# Patient Record
Sex: MALE | Race: WHITE | URBAN - METROPOLITAN AREA
[De-identification: names, ages, dates, MRNs, and addresses within clinical notes are randomized per-mention and may not be internally consistent; named-entity substitution may affect disease eponyms.]

---

## 2024-10-30 ENCOUNTER — TELEPHONE (OUTPATIENT)
Dept: TRANSPLANT | Facility: CLINIC | Age: 39
End: 2024-10-30

## 2024-11-11 ENCOUNTER — TELEPHONE (OUTPATIENT)
Dept: TRANSPLANT | Facility: CLINIC | Age: 39
End: 2024-11-11

## 2024-11-18 ENCOUNTER — TELEPHONE (OUTPATIENT)
Dept: TRANSPLANT | Facility: CLINIC | Age: 39
End: 2024-11-18
Payer: COMMERCIAL

## 2024-11-18 ENCOUNTER — EPISODE CHANGES (OUTPATIENT)
Dept: TRANSPLANT | Facility: CLINIC | Age: 39
End: 2024-11-18

## 2024-11-18 DIAGNOSIS — N18.6 END STAGE RENAL DISEASE: Primary | ICD-10-CM

## 2024-11-18 DIAGNOSIS — Z76.82 ORGAN TRANSPLANT CANDIDATE: ICD-10-CM

## 2024-11-27 ENCOUNTER — TELEPHONE (OUTPATIENT)
Dept: TRANSPLANT | Facility: CLINIC | Age: 39
End: 2024-11-27
Payer: COMMERCIAL

## 2024-12-02 ENCOUNTER — TELEPHONE (OUTPATIENT)
Dept: TRANSPLANT | Facility: CLINIC | Age: 39
End: 2024-12-02
Payer: COMMERCIAL

## 2024-12-02 NOTE — TELEPHONE ENCOUNTER
MA notes per Adherence form     PD PT    FOR THE PAST THREE MONTHS:    0-AMA's  0-No-shows    No concerns with care giving, transportation, or mental health    Pt is now with   [facility] ([facility])  [address] [place] [id number]    [phone] fax [phone]  Admit date 8/19/24  PD PT    Scanned in pt's media    Taina Bonilla  Abdominal Transplant MA

## 2024-12-02 NOTE — TELEPHONE ENCOUNTER
Compliance form sent to Dialysis Unit    [facility]   [address] [place] [address]    [phone]  Admit date 8/19/24  PD PT    Hafsa Villalobos MA

## 2024-12-03 ENCOUNTER — HOSPITAL ENCOUNTER (OUTPATIENT)
Dept: RADIOLOGY | Facility: HOSPITAL | Age: 39
Discharge: HOME | End: 2024-12-03
Attending: NURSE PRACTITIONER
Payer: COMMERCIAL

## 2024-12-03 ENCOUNTER — HOSPITAL ENCOUNTER (OUTPATIENT)
Dept: RADIOLOGY | Facility: HOSPITAL | Age: 39
Discharge: HOME | End: 2024-12-03
Attending: NURSE PRACTITIONER
Payer: MEDICARE

## 2024-12-03 ENCOUNTER — OFFICE VISIT (OUTPATIENT)
Dept: TRANSPLANT | Facility: CLINIC | Age: 39
End: 2024-12-03
Payer: MEDICARE

## 2024-12-03 ENCOUNTER — TELEPHONE (OUTPATIENT)
Dept: TRANSPLANT | Facility: CLINIC | Age: 39
End: 2024-12-03
Payer: COMMERCIAL

## 2024-12-03 VITALS
OXYGEN SATURATION: 99 % | HEART RATE: 97 BPM | BODY MASS INDEX: 37.91 KG/M2 | RESPIRATION RATE: 18 BRPM | DIASTOLIC BLOOD PRESSURE: 83 MMHG | HEIGHT: 69 IN | SYSTOLIC BLOOD PRESSURE: 142 MMHG | TEMPERATURE: 97 F | WEIGHT: 255.94 LBS

## 2024-12-03 DIAGNOSIS — N18.6 END STAGE RENAL DISEASE: ICD-10-CM

## 2024-12-03 DIAGNOSIS — Z76.82 ORGAN TRANSPLANT CANDIDATE: ICD-10-CM

## 2024-12-03 DIAGNOSIS — Z99.2 ESRD ON PERITONEAL DIALYSIS: ICD-10-CM

## 2024-12-03 DIAGNOSIS — E78.5 HYPERLIPIDEMIA, UNSPECIFIED HYPERLIPIDEMIA TYPE: ICD-10-CM

## 2024-12-03 DIAGNOSIS — N18.6 ESRD ON PERITONEAL DIALYSIS: ICD-10-CM

## 2024-12-03 DIAGNOSIS — I10 PRIMARY HYPERTENSION: ICD-10-CM

## 2024-12-03 DIAGNOSIS — Z01.818 PRE-TRANSPLANT EVALUATION FOR KIDNEY TRANSPLANT: Primary | ICD-10-CM

## 2024-12-03 DIAGNOSIS — N25.81 SECONDARY HYPERPARATHYROIDISM: ICD-10-CM

## 2024-12-03 DIAGNOSIS — E11.29 TYPE 2 DIABETES MELLITUS WITH OTHER DIABETIC KIDNEY COMPLICATION: ICD-10-CM

## 2024-12-03 PROBLEM — E03.9 HYPOTHYROIDISM: Status: ACTIVE | Noted: 2023-07-05

## 2024-12-03 PROBLEM — G47.33 OSA (OBSTRUCTIVE SLEEP APNEA): Status: ACTIVE | Noted: 2017-08-30

## 2024-12-03 PROBLEM — E11.21 DIABETIC NEPHROPATHY WITH PROTEINURIA: Status: ACTIVE | Noted: 2023-07-05

## 2024-12-03 PROCEDURE — 72192 CT PELVIS W/O DYE: CPT | Mod: 26,TXP,, | Performed by: RADIOLOGY

## 2024-12-03 PROCEDURE — 76770 US EXAM ABDO BACK WALL COMP: CPT | Mod: TC,TXP

## 2024-12-03 PROCEDURE — 71046 X-RAY EXAM CHEST 2 VIEWS: CPT | Mod: 26,TXP,, | Performed by: RADIOLOGY

## 2024-12-03 PROCEDURE — 99214 OFFICE O/P EST MOD 30 MIN: CPT | Mod: S$PBB,TXP,, | Performed by: SURGERY

## 2024-12-03 PROCEDURE — 93978 VASCULAR STUDY: CPT | Mod: 26,TXP,, | Performed by: RADIOLOGY

## 2024-12-03 PROCEDURE — 76770 US EXAM ABDO BACK WALL COMP: CPT | Mod: 26,TXP,, | Performed by: RADIOLOGY

## 2024-12-03 PROCEDURE — 99999 PR PBB SHADOW E&M-EST. PATIENT-LVL V: CPT | Mod: PBBFAC,TXP,, | Performed by: NURSE PRACTITIONER

## 2024-12-03 PROCEDURE — 71046 X-RAY EXAM CHEST 2 VIEWS: CPT | Mod: TC,TXP

## 2024-12-03 PROCEDURE — 93978 VASCULAR STUDY: CPT | Mod: TC,TXP

## 2024-12-03 PROCEDURE — 72192 CT PELVIS W/O DYE: CPT | Mod: TC,TXP

## 2024-12-03 RX ORDER — LEVOTHYROXINE SODIUM 50 UG/1
50 TABLET ORAL
COMMUNITY

## 2024-12-03 RX ORDER — CALCIUM ACETATE 667 MG/1
2001 CAPSULE ORAL
COMMUNITY

## 2024-12-03 RX ORDER — ERGOCALCIFEROL 1.25 MG/1
50000 CAPSULE ORAL
COMMUNITY

## 2024-12-03 RX ORDER — AMLODIPINE BESYLATE 10 MG/1
10 TABLET ORAL DAILY
COMMUNITY

## 2024-12-03 RX ORDER — DULAGLUTIDE 1.5 MG/.5ML
1.5 INJECTION, SOLUTION SUBCUTANEOUS
COMMUNITY

## 2024-12-03 RX ORDER — ALLOPURINOL 100 MG/1
100 TABLET ORAL DAILY
COMMUNITY

## 2024-12-03 RX ORDER — LOSARTAN POTASSIUM 100 MG/1
100 TABLET ORAL DAILY
COMMUNITY

## 2024-12-03 RX ORDER — NIFEDIPINE 60 MG/1
60 TABLET, EXTENDED RELEASE ORAL DAILY
COMMUNITY

## 2024-12-03 RX ORDER — FUROSEMIDE 80 MG/1
80 TABLET ORAL 2 TIMES DAILY
COMMUNITY

## 2024-12-03 RX ORDER — ATORVASTATIN CALCIUM 20 MG/1
20 TABLET, FILM COATED ORAL DAILY
COMMUNITY

## 2024-12-03 NOTE — PROGRESS NOTES
Transplant Recipient Adult Psychosocial Assessment    Aide Correa, wife/caregiver and Fatoumata Correa, mother/caregiver present during visit with patients permission.    Zeeshan Correa  [address]  [place] [address]  No relevant phone numbers on file.   Home  [phone] (home)  Work  There is no work phone number on file.  E-mail  [email or web address]    Sex: male  YOB: 1985  Age: 39 y.o.    Encounter Date: 12/3/2024  [place] Citizen: yes  Primary Language: English   Needed: no    Emergency Contact:  Name: Aide Correa  Relationship: wife  Address: [address] [place] [address]  Phone Numbers: [phone] (mobile)    Family/Social Support:   Number of dependents/: 0  Marital history:   Other family dynamics: Parents, wife, 1 brother    Household Composition:  Name: Aide Correa  Age: 35  Relationship: wife  Does person drive? yes    Do you and your caregivers have access to reliable transportation? yes  PRIMARY CAREGIVER: Aide Correa will be primary caregiver, phone number [phone].      provided in-depth information to patient and caregiver regarding pre- and post-transplant caregiver role.   strongly encourages patient and caregiver to have concrete plan regarding post-transplant care giving, including back-up caregiver(s) to ensure care giving needs are met as needed.    Patient and Caregiver states understanding all aspects of caregiver role/commitment and is able/willing/committed to being caregiver to the fullest extent necessary.    Patient and Caregiver verbalizes understanding of the education provided today and caregiver responsibilities.         remains available. Patient and Caregiver agree to contact  in a timely manner if concerns arise.      Able to take time off work without financial concerns: yes.     Additional Significant Others who will Assist with Transplant:  Name: Fatoumata Correa, [phone]  Age:  60  City: [place] State: [place]  Relationship: mother  Does person drive? yes    Living Will: no  Healthcare Power of : no  Advance Directives on file:  provided patient with copy of LW/HCPA documents and provided education on completion of forms.    Living Donors: Education and resource information given to patient. Patient has family and friends interested in getting tested to be a living donor.     Highest Education Level: High School (9-12) or GED  Reading Ability: 11th grade  Reports difficulty with: N/A Patient wear glasses  Learns Best By:  Hands-on instructions     Status: no  VA Benefits: no     Working for Income: yes  If yes, working activity level: Working Full Time  Patient is  employed at [employer].    Spouse/Significant Other Employment: Wife is employed at [employer]    Disabled: no    Monthly Income:  Other: $4000  Able to afford all costs now and if transplanted, including medications: yes  Patient and Caregiver verbalizes understanding of personal responsibilities related to transplant costs and the importance of having a financial plan to ensure that patients transplant costs are fully covered.      provided fundraising information/education.  Patient and Caregiver verbalizes understanding.   remains available.    Insurance:   Payer/Plan Subscr  Sex Relation Sub. Ins. ID Effective Group Num   1. BLUE CROSS BL* PETEY REYNOLDS 1985 Male Self [id number]                                   [address] [address]   2. MEDICARE - ME* PETEY REYNOLDS 1985 Male Self [id number]                                    [address]     Primary Insurance (for UNOS reporting): Private Insurance  Secondary Insurance (for UNOS reporting): Public Insurance - Medicare & Choice  Patient and Caregiver verbalizes clear understanding that patient may experience difficulty obtaining and/or be denied insurance coverage  post-surgery. This includes and is not limited to disability insurance, life insurance, health insurance, burial insurance, long term care insurance, and other insurances.    Patient and Caregiver also reports understanding that future health concerns related to or unrelated to transplantation may not be covered by patient's insurance.  Resources and information provided and reviewed.      Patient and Caregiver provides verbal permission to release any necessary information to outside resources for patient care and discharge planning.  Resources and information provided are reviewed.      Dialysis Adherence:  Patient reports compliance with peritoneal dialysis at home. Dialysis adherence form completed and returned by patient's dialysis unit can be found under 'Media' section of EMR. Compliance reported as satisfactory.    Infusion Service: patient utilizing? no  Home Health: patient utilizing? no  DME: no  Pulmonary/Cardiac Rehab: NO   ADLS: Patient can ambulate, complete ADL's, drive and manage medications without assistance.    Adherence:  Adherence education and counseling provided.     Per History Section:  Past Medical History:   Diagnosis Date    Diabetes mellitus, type 2     Disorder of kidney and ureter     Gout, unspecified     Hypertension     Thyroid disease      Social History     Tobacco Use    Smoking status: Not on file    Smokeless tobacco: Not on file   Substance Use Topics    Alcohol use: Not on file     Social History     Substance and Sexual Activity   Drug Use Not on file     Social History     Substance and Sexual Activity   Sexual Activity Not on file       Per Today's Psychosocial:  Tobacco: none, patient denies any use.  Alcohol: none, patient denies any use.  Illicit Drugs/Non-prescribed Medications: none, patient denies any use.    Patient and Caregiver states clear understanding of the potential impact of substance use as it relates to transplant candidacy and is aware of possible random  substance screening.  Substance abstinence/cessation counseling, education and resources provided and reviewed.     Arrests/DWI/Treatment/Rehab: patient denies    Psychiatric History:    Mental Health:  Patient denies  Psychiatrist/Counselor: patient denies  Medications:  patient denies  Suicide/Homicide Issues: patient denies   Safety at home: Patient reported feeling safe at home.     Patient denied needing or wanting resources or referrals at this time. Patient agreed to contact  team as needed.    Knowledge: Patient and Caregiver states having clear understanding and realistic expectations regarding the potential risks and potential benefits of organ transplantation and organ donation, agrees to discuss with health care team members and support system members, and to utilize available resources and express questions and/or concerns in order to further facilitate the pt informed decision-making.  Resources and information provided and reviewed.     Patient and Caregiver is aware of [facility]'s affiliation and/or partnership with agencies in home health care, LTAC, SNF, [facility] [place] [facility], and other hospitals and clinics.    Understanding: Patient and Caregiver reports having a clear understanding of the many lifetime commitments involved with being a transplant recipient, including costs, compliance, medications, lab work, procedures, appointments, concrete and financial planning, preparedness, timely and appropriate communication of concerns, abstinence (ETOH, tobacco, illicit non-prescribed drugs), adherence to all health care team recommendations, support system and caregiver involvement, appropriate and timely resource utilization and follow-through, mental health counseling as needed/recommended, and patient and caregiver responsibilities.  Social Service Handbook, resources and detailed educational information provided and reviewed.  Educational information provided.    Patient and Caregiver also reports  current and expected compliance with health care regime and states having a clear understanding of the importance of compliance.      Patient and Caregiver reports a clear understanding that risks and benefits may be involved with organ transplantation and with organ donation.      Patient and Caregiver also reports clear understanding that psychosocial risk factors may affect patient, and include but are not limited to feelings of depression, generalized anxiety, anxiety regarding dependence on others, post traumatic stress disorder, feelings of guilt and other emotional and/or mental concerns, and/or exacerbation of existing mental health concerns.  Detailed resources provided and discussed.     Patient and Caregiver agrees to access appropriate resources in a timely manner as needed and/or as recommended, and to communicate concerns appropriately.  Patient and Caregiver also reports a clear understanding of treatment options available.      reviewed education, provided additional information, and answered questions.    Feelings or Concerns:Patient denies having any concerns and/or overwhelming feelings regarding transplant currently.    Coping: Identify Patient & Caregiver Strategies to [place]:   1. In the past - Family   2. Currently & Pre-transplant - Family   3. At the time of surgery - Family   4. During post-Transplant & Recovery Period - Family    Goals: To live a healthier life and discontinue dialysis.  Patient referred to Vocational Rehabilitation.    Interview Behavior: Patient and Caregiver presents as alert and oriented x 4, pleasant, good eye contact, well groomed, recall good, concentration/judgement good, average intelligence, calm, communicative, cooperative, and asking and answering questions appropriately.          Transplant Social Work - Candidacy  Assessment/Plan:     Psychosocial Suitability: Patient presents as a good candidate for kidney transplant at this time. Based on  psychosocial risk factors, patient presents as family and caregiver support, good adherence, appropriate coping skills, medical insurance, reliable transportation and being able to financially can afford medications..    Recommendations/Additional Comments: Patient is highly motivated to receive kidney transplant. SW recommends patient contact insurance provider regarding lodging/travel benefits, conduct fundraising to assist patient with pay for cost of medications, food, gas, and other transplant related needs. SW recommends that patient remain aware of potential mental health concerns and contact the team if any concerns arise.  SW recommends that patient remain abstinent from tobacco, ETOH, and drug use. SW supports patient continued adherence. SW remains available to answer any questions or concerns that arise as the patient moves through the transplant process.       Final determination of transplant candidacy will be reviewed and determined by the selection committee    Sulma Galdamez LCSW

## 2024-12-03 NOTE — LETTER
December 4, 2024        Pallavi Shirsat  [address]  [address]  [place] [address]  Phone: [phone]  Fax: [phone]             [facility]- Transplant 1st Fl  [address]  [facility] [address]  Phone: [phone]   Patient: Zeeshan Correa   MR Number: [id number]   YOB: 1985   Date of Visit: 12/3/2024       Dear Dr. Pallavi Shirsat    Thank you for referring Zeeshan Correa to me for evaluation. Attached you will find relevant portions of my assessment and plan of care.    If you have questions, please do not hesitate to call me. I look forward to following Zeeshan Correa along with you.    Sincerely,    Macie Pittman NP    Enclosure    If you would like to receive this communication electronically, please contact [email or web address] or [phone] to request [employer] Link access.    [employer] Link is a tool which provides read-only access to select patient information with whom you have a relationship. Its easy to use and provides real time access to review your patients record including encounter summaries, notes, results, and demographic information.    If you feel you have received this communication in error or would no longer like to receive these types of communications, please e-mail [email or web address]

## 2024-12-03 NOTE — PROGRESS NOTES
Transplant Surgery  Kidney Transplant Recipient Evaluation    Referring Physician: Pallavi Shirsat  Current Nephrologist: Pallavi Shirsat    Subjective:     Reason for Visit: evaluate transplant candidacy    History of Present Illness: Zeeshan Correa is a 39 y.o. year old male undergoing transplant evaluation.    Dialysis History: Zeeshan is on peritoneal dialysis.      Transplant History: N/A    Etiology of Renal Disease: Diabetes Mellitus - Type II (based on medical records from referral).    External provider notes reviewed: No    Review of Systems   Constitutional:  Positive for fatigue.   HENT:  Negative for drooling, postnasal drip and sore throat.    Eyes:  Negative for discharge and itching.   Respiratory:  Negative for choking and stridor.    Gastrointestinal:  Negative for rectal pain.   Endocrine: Negative for polydipsia.   Genitourinary:  Negative for enuresis and genital sores.   Musculoskeletal:  Negative for back pain, neck pain and neck stiffness.   Allergic/Immunologic: Negative for immunocompromised state.   Neurological:  Negative for facial asymmetry and numbness.   Hematological:  Negative for adenopathy.   Psychiatric/Behavioral:  Negative for behavioral problems, self-injury and suicidal ideas.    Objective:     Physical Exam:  Constitutional:   Vitals reviewed: yes   Well-nourished and well-groomed: yes  Eyes:   Sclerae icteric: no   Extraocular movements intact: yes  GI:    Bowel sounds normal: yes   Tenderness: no    If yes, quadrant/location: not applicable   Palpable masses: no    If yes, quadrant/location: not applicable   Hepatosplenomegaly: no   Ascites: no   Hernia: no    If yes, type/location: not applicable   Surgical scars: yes    If yes, type/location: PD cannula in place on Left  Resp:   Effort normal: yes   Breath sounds normal: yes    CV:   Regular rate and rhythm: yes   Heart sounds normal: yes   Femoral pulses normal: yes   Extremities edematous: no  Skin:   Rashes or lesions  present: no    If yes, describe:not applicable   Jaundice:: no    Musculoskeletal:   Gait normal: yes   Strength normal: yes  Psych:   Oriented to person, place, and time: yes   Affect and mood normal: yes    Additional comments: not applicable    Diagnostics:  The following labs have been reviewed: CBC  CMP    Counseling: We provided Zeeshan Correa with a group education session today.  We discussed kidney transplantation at length with him, including risks, potential complications, and alternatives in the management of his renal failure.  The discussion included complications related to anesthesia, bleeding, infection, primary nonfunction, and ATN.  I discussed the typical postoperative course, length of hospitalization, the need for long-term immunosuppression, and the need for long-term routine follow-up.  I discussed living-donor and -donor transplantation and the relative advantages and disadvantages of each.  I also discussed average waiting times for both living donation and  donation.  I discussed national and center-specific survival rates.  I also mentioned the potential benefit of multicenter listing to candidates listed with centers within more than one organ procurement organization.  All questions were answered.    Patient advised that it is recommended that all transplant candidates, and their close contacts and household members receive Covid vaccination.    Final determination of transplant candidacy will be made once evaluation is complete and reviewed by the Kidney & Kidney/Pancreas Selection Committee.    Coronavirus disease (COVID-19) caused by severe acute respiratory virus coronavirus 2 (SARS-C0V 2) is associated with increased mortality in solid organ transplant recipients (SOT) compared to non-transplant patients. Vaccine responses to vaccination are depressed against SARS-CoV2 compared to normal individuals but improve with third vaccination doses. Vaccination prior to SOT  provides both the best opportunity for transplant candidates to develop protective immunity and to reduce the risk of serious COVID19 infections post transplantation. Organ transplant candidates at [facility] will be required to receive SARS-CoV-2 vaccination prior to being listed with a an active status, whenever possible. Exceptions will be made for disability related reasons or for sincerely held Moravian beliefs.          Transplant Surgery - Candidacy   Assessment/Plan:   Zeeshan Correa has end stage renal disease (ESRD) on dialysis. I see no surgical contraindication to placing a kidney transplant. Based on available information, Zeeshan Correa is a suitable kidney transplant candidate.     Additional testing to be completed according to the Written Order Guidelines for Adult Pre-kidney and Pancreas Transplant Evaluation (KI-02).  Interpretation of tests and discussion of patient management involves all members of the multidisciplinary transplant team.    Nirav Moore MD

## 2024-12-03 NOTE — TELEPHONE ENCOUNTER
Reviewed pt transplant labs. Pt is being followed by a dietitian at their dialysis unit and the dialysis unit dietitian was notified of the following abnormal labs via fax.    A1c 6.5  Cholesterol 207  Triglycerides 213  Glucose 114  Calcium 7.6  Phos 7.2     RD available if needed.

## 2024-12-03 NOTE — PROGRESS NOTES
PHARM.D. PRE-TRANSPLANT NOTE:    This patient's medication therapy was evaluated as part of his pre-transplant evaluation.      The following general pharmacologic concerns were noted: none    The following concerns for post-operative pain management were noted: none    The following pharmacologic concerns related to HCV therapy were noted: none      This patient's medication profile was reviewed for considerations for DAA Hepatitis C therapy:    [x]  No current inducers of CYP 3A4 or PGP  [x]  No amiodarone on this patient's EMR profile in the last 24 months  [x]  No past or current atrial fibrillation on this patient's EMR profile       Current Outpatient Medications   Medication Sig Dispense Refill    allopurinoL (ZYLOPRIM) 100 MG tablet Take 100 mg by mouth once daily.      amLODIPine (NORVASC) 10 MG tablet Take 10 mg by mouth once daily.      atorvastatin (LIPITOR) 20 MG tablet Take 20 mg by mouth once daily.      calcium acetate,phosphat bind, (PHOSLO) 667 mg capsule Take 2,001 mg by mouth 3 (three) times daily with meals.      dulaglutide (TRULICITY) 1.5 mg/0.5 mL pen injector Inject 1.5 mg into the skin every 7 days.      empagliflozin (JARDIANCE) 25 mg tablet Take 25 mg by mouth once daily.      ergocalciferol (VITAMIN D2) 50,000 unit Cap Take 50,000 Units by mouth every 7 days.      furosemide (LASIX) 80 MG tablet Take 80 mg by mouth 2 (two) times daily.      levothyroxine (SYNTHROID) 50 MCG tablet Take 50 mcg by mouth before breakfast.      losartan (COZAAR) 100 MG tablet Take 100 mg by mouth once daily.      NIFEdipine (PROCARDIA-XL) 60 MG (OSM) 24 hr tablet Take 60 mg by mouth once daily.       No current facility-administered medications for this visit.           I am available for consultation and can be contacted, as needed by the other members of the Transplant team.

## 2024-12-03 NOTE — PROGRESS NOTES
Transplant Nephrology  Kidney Transplant Recipient Evaluation    Referring Physician: Pallavi Shirsat  Current Nephrologist: Pallavi Shirsat    Subjective:   CC:  Initial evaluation of kidney transplant candidacy.    HPI:  Mr. Correa is a 39 y.o. year old White male who has presented to be evaluated as a potential kidney transplant recipient.  He has ESRD secondary to presumed diabetic nephropathy and HTN, no renal biopsy.  Patient is currently on peritoneal dialysis started on 8/19/2024. Patient is dialyzing on cyclic peritoneal dialysis.  Patient reports that he is tolerating dialysis well. . He has a PD catheter for dialysis access.     DM2 and HTN diagnosed about 3 years ago. Does admit there was a period of time he was not on meds. Denies retinopathy, neuropathy, or gastroparesis. A1c today 6.5.    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 No functional impairments. Not frail. Has several potential living donors.     Denies MI, CVA, DVT/PE, or malignancy history. No family history of kidney disease.     Current Outpatient Medications   Medication Sig Dispense Refill    allopurinoL (ZYLOPRIM) 100 MG tablet Take 100 mg by mouth once daily.      amLODIPine (NORVASC) 10 MG tablet Take 10 mg by mouth once daily.      atorvastatin (LIPITOR) 20 MG tablet Take 20 mg by mouth once daily.      calcium acetate,phosphat bind, (PHOSLO) 667 mg capsule Take 2,001 mg by mouth 3 (three) times daily with meals.      dulaglutide (TRULICITY) 1.5 mg/0.5 mL pen injector Inject 1.5 mg into the skin every 7 days.      empagliflozin (JARDIANCE) 25 mg tablet Take 25 mg by mouth once daily.      ergocalciferol (VITAMIN D2) 50,000 unit Cap Take 50,000 Units by mouth every 7 days.      furosemide (LASIX) 80 MG tablet Take 80 mg by mouth 2 (two) times daily.      levothyroxine (SYNTHROID) 50 MCG tablet Take 50 mcg by mouth before breakfast.      losartan (COZAAR) 100 MG tablet Take 100 mg by mouth once daily.    "   NIFEdipine (PROCARDIA-XL) 60 MG (OSM) 24 hr tablet Take 60 mg by mouth once daily.       No current facility-administered medications for this visit.       Past Medical History:   Diagnosis Date    Anemia     Diabetes mellitus, type 2     Disorder of kidney and ureter     Gout, unspecified     Hypertension     Thyroid disease      Past Medical and Surgical History: Mr. Correa  has a past medical history of Anemia, Diabetes mellitus, type 2, Disorder of kidney and ureter, Gout, unspecified, Hypertension, and Thyroid disease.  He has a past surgical history that includes Peritoneal catheter insertion.    Past Social and Family History: Mr. Correa reports that he has never smoked. He has never used smokeless tobacco. He reports that he does not currently use alcohol. He reports that he does not use drugs. His family history includes Cancer in his father.    Review of Systems   Constitutional:  Positive for fatigue. Negative for activity change and fever.   Eyes:  Negative for visual disturbance.   Respiratory:  Negative for cough and shortness of breath.    Cardiovascular:  Negative for chest pain and leg swelling.   Gastrointestinal:  Negative for abdominal pain, constipation, diarrhea and nausea.   Genitourinary:  Negative for difficulty urinating, frequency and hematuria.   Musculoskeletal:  Negative for arthralgias and myalgias.   Skin:  Negative for wound.   Neurological:  Negative for weakness.   Psychiatric/Behavioral:  Negative for sleep disturbance.        Objective:   Blood pressure (!) 142/83, pulse 97, temperature 97.3 °F (36.3 °C), temperature source Temporal, resp. rate 18, height 5' 8.5" (1.74 m), weight 116.1 kg (255 lb 15.3 oz), SpO2 99%.body mass index is 38.35 kg/m².    Physical Exam  Vitals and nursing note reviewed.   Constitutional:       Appearance: Normal appearance.   Cardiovascular:      Rate and Rhythm: Normal rate and regular rhythm.      Heart sounds: Normal heart sounds.   Pulmonary:      " Effort: Pulmonary effort is normal.      Breath sounds: Normal breath sounds.   Abdominal:      General: There is no distension.      Comments: PD catheter    Musculoskeletal:         General: Normal range of motion.   Skin:     General: Skin is warm and dry.   Neurological:      Mental Status: He is alert.         Labs:  Lab Results   Component Value Date    WBC 16.44 (H) 12/03/2024    HGB 11.0 (L) 12/03/2024    HCT 35.0 (L) 12/03/2024     12/03/2024    K 4.5 12/03/2024     12/03/2024    CO2 18 (L) 12/03/2024    BUN 79 (H) 12/03/2024    CREATININE 7.5 (H) 12/03/2024    EGFRNORACEVR 8.7 (A) 12/03/2024    CALCIUM 7.6 (L) 12/03/2024    PHOS 7.2 (H) 12/03/2024    ALBUMIN 3.6 12/03/2024    AST 13 12/03/2024    ALT 17 12/03/2024    .5 (H) 12/03/2024     Labs were reviewed with the patient.    Assessment:     1. Pre-transplant evaluation for kidney transplant    2. ESRD on peritoneal dialysis    3. Primary hypertension    4. Type 2 diabetes mellitus with other diabetic kidney complication    5. Hyperlipidemia, unspecified hyperlipidemia type    6. Secondary hyperparathyroidism    7. BMI 38.0-38.9,adult      Plan:   39 y.o. year old White male who has presented to be evaluated as a potential kidney transplant recipient.  He has ESRD secondary to presumed diabetic nephropathy and HTN, no renal biopsy.  Patient is currently on peritoneal dialysis started on 8/19/2024. Will be ready for selection following afternoon imaging and cardiac testing. Encouraged weight loss.       Transplant Candidacy:   Based on available information, Mr. Correa is a suitable kidney transplant candidate.   Meets center eligibility for accepting HCV+ donor offer - Yes.  Patient educated on HCV+ donors. Zeeshan is willing to accept HCV+ donor offer - Yes   Patient is a candidate for KDPI > 85 kidney donor offer - No (age, weight)  Final determination of transplant candidacy will be made once workup is complete and reviewed by the  selection committee.    Patient advised that it is recommended that all transplant candidates, and their close contacts and household members receive Covid vaccination.    UNOS Patient Status  Functional Status: 80% - Normal activity with effort: some symptoms of disease    Macie Pittman, NP

## 2024-12-03 NOTE — PROGRESS NOTES
PRE-TRANSPLANT INFECTIOUS DISEASE CONSULT    Reason for Visit:  Pre-transplant evaluation  Referring Provider: Aaareferral Self     History of Present Illness:    39 y.o. male with a history of esrd on pd presents for pre-kidney transplant evaluation.  On PD - denies ever infections.    Infectious History:  Recent hospital admissions: No  Recent infections: No  Recent or current antibiotic use: No  History of recurrent infections *(sinus / pneumonia / UTI / SBP)*: No  History of  foot wound or bone/joint infection: No  Recent dental infections, issues or procedures: No  History of chicken pox: Yes  History of shingles: No  History of STI: No  History of COVID infection: Yes    History of Immunosuppression:  Prior chemotherapy / immunosuppression: No  Prior transplant: No  History of splenectomy: No    Tuberculosis:  Prior screening for latent TB: Yes - TST reportedly negative  Prior diagnosis of latent TB: No  Risk factors for TB *known exposure, incarceration, homelessness*: No    Geographical exposures:  Currently lives in [facility] with wife  Lived in the following states: [place], [place], [place]  Lived or travelled to the [facility] [place]: No  International travel: No  Travel-associated illness: No    Social/Environmental:  Occupation:    Pets: Yes - dogs  Livestock: No  Fishing / hunting: Yes  Hobbies: Hunting/Fishing  Water: [place]  Consumption of raw/undercooked meat or seafood?  Yes - sushi  Tobacco: No  Alcohol: No  Recreational drug use:  No  Sexual partners: wife        Past Histories:   Past Medical History:   Diagnosis Date    Anemia     Diabetes mellitus, type 2     Disorder of kidney and ureter     Gout, unspecified     Hypertension     Thyroid disease      Past Surgical History:   Procedure Laterality Date    PERITONEAL CATHETER INSERTION       Family History   Problem Relation Name Age of Onset    Cancer Father       Social History     Tobacco Use    Smoking status: Never    Smokeless tobacco: Never    Substance Use Topics    Alcohol use: Not Currently    Drug use: Never     Review of patient's allergies indicates:  No Known Allergies      Current antibiotics:  Antibiotics (From admission, onward)      None              Review of Systems  Review of Systems   Constitutional: Negative for chills, fever, malaise/fatigue and night sweats.   Cardiovascular:  Negative for chest pain.   Respiratory:  Negative for cough, hemoptysis, shortness of breath, sputum production and wheezing.    Skin:  Negative for rash and suspicious lesions.   Gastrointestinal:  Negative for abdominal pain, constipation, diarrhea, heartburn, nausea and vomiting.   Genitourinary:  Negative for dysuria and hematuria.          Objective  Physical Exam  Constitutional:       General: He is not in acute distress.     Appearance: Normal appearance. He is well-developed. He is not ill-appearing, toxic-appearing or diaphoretic.   HENT:      Head: Normocephalic and atraumatic.      Mouth/Throat:      Lips: Pink. No lesions.      Mouth: Mucous membranes are moist. No injury or oral lesions.      Dentition: Abnormal dentition (some mmissing teeth). Does not have dentures. Dental caries (filled) present. No gingival swelling, dental abscesses or gum lesions.      Tongue: No lesions.      Palate: No lesions.      Pharynx: Oropharynx is clear. No pharyngeal swelling.   Cardiovascular:      Rate and Rhythm: Normal rate and regular rhythm.      Heart sounds: Normal heart sounds. No murmur heard.     No friction rub. No gallop.   Pulmonary:      Effort: Pulmonary effort is normal. No respiratory distress.      Breath sounds: Normal breath sounds. No wheezing or rales.   Abdominal:      General: Bowel sounds are normal. There is no distension.      Palpations: Abdomen is soft. There is no mass.      Tenderness: There is no abdominal tenderness. There is no guarding or rebound.   Skin:     General: Skin is warm and dry.   Neurological:      Mental Status: He is  "alert and oriented to person, place, and time.   Psychiatric:         Behavior: Behavior normal.         Labs:    CBC:   Lab Results   Component Value Date    WBC 16.44 (H) 12/03/2024    HGB 11.0 (L) 12/03/2024    HCT 35.0 (L) 12/03/2024    MCV 90 12/03/2024     12/03/2024    GRAN 13.9 (H) 12/03/2024    GRAN 84.5 (H) 12/03/2024    LYMPH 1.3 12/03/2024    LYMPH 7.8 (L) 12/03/2024    MONO 0.8 12/03/2024    MONO 5.0 12/03/2024    EOSINOPHIL 1.9 12/03/2024       Syphilis screening:   Lab Results   Component Value Date    TREPABIGMIGG Nonreactive 12/03/2024        TB screening: No results found for: "TBGOLDPLUS", "TSPOTSCREN"    HIV screening:   Lab Results   Component Value Date    BZN36TJBL Non-reactive 12/03/2024       Strongyloides IgG: No results found for: "STRONGANTIGG"    Hepatitis Serologies:   Lab Results   Component Value Date    HEPAIGG Non-reactive 12/03/2024    HEPBCAB Non-reactive 12/03/2024    HEPBSAB 170.75 12/03/2024    HEPBSAB Reactive 12/03/2024    HEPCAB Non-reactive 12/03/2024        Varicella IgG: No results found for: "VARICELLAINT"        There is no immunization history on file for this patient.     Immunization History:  Received all childhood vaccines: Yes  All household members receive annual flu vaccine: No  All household members are up to date on COVID vaccine: No    Assessment and Plan    1. Risks of Infection: Available serologies were reviewed. No unusual risks of infection or significant barriers to transplantation were identified from the infectious disease standpoint given the information available at this time.    - Acute infectious issues: None   - Pending serologies: none   - Please call if any pending serologic testing is positive.    2. Immunizations:  Based on the patient's immunization history and serologies, the following immunizations are recommended:  - Hepatitis A    Patient does not have immunity to hepatitis A    Vaccination ordered today: Yes   - Hepatitis " B    Patient does have immunity to hepatitis B    Vaccination ordered today: No. Reason for not ordering: Immunity   - COVID    Current CDC vaccination recommendations were discussed with the patient   - Annual high dose influenza     Vaccination ordered today: Yes   - Prevnar 20    Vaccination ordered today: Yes   - Tdap    Vaccination ordered today: Yes   - Shingrix    Vaccination ordered today: Yes    Recommended Pre-Transplant Immunization Schedule   Vaccine  0m 1m 2m 6m   Pneumococcal conjugate vaccine (Prevnar 20) X      Tetanus-diphtheria-pertussis (Tdap)* X      Hepatitis A Vaccine (Havrix)** X   X   Hepatitis B Vaccine (Heplisav)** X X     Influenza (annual) X      Zoster Recombinant Vaccine (Shingrix) X  X           *Administer booster every 10 years.       **Administer if no immunity demonstrated on serologies               Patient will receive vaccines at local pharmacy. A written prescription was provided for all vaccine doses.     3. Counseling:   I discussed with the patient the risk for increased susceptibility to infections following transplantation including increased risk for infection right after transplant and if rejection should occur.  The patient has been counseled on the importance of vaccinations to decrease risk of infection and severe illness. Specific guidance has been provided to the patient regarding the patient's occupation, hobbies and activities to avoid future infectious complications.     4. Transplant Candidacy: Based on available information, there are no identified significant barriers to transplantation from an infectious disease standpoint.  Final determination of transplant candidacy will be made once evaluation is complete and reviewed by the Selection Committee.      Follow up with infectious disease as needed.       The total time for evaluation and management services performed on 12/03/2024 was greater than 45 minutes.

## 2024-12-03 NOTE — PROGRESS NOTES
INITIAL PATIENT EDUCATION NOTE     Mr. Zeeshan Correa was seen in pre-kidney transplant clinic for evaluation for kidney, kidney/pancreas or pancreas only transplant.  The patient attended an individual video education session that discussed/reviewed the following aspects of transplantation: evaluation including diagnostic and laboratory testing,(Chemistries, Hematology, Serologies including HIV and Hepatitis and HLA) required for transplantation and selection committee process, UNOS waitlist management/multiple listings, types of organs offered (KDPI < 85%, KDPI > 85%, PHS risk, DCD, HCV+, HIV+ for HIV+ recipients and enbloc/dual), financial aspects, surgical procedures, dietary instruction pre- and post-transplant, health maintenance pre- and post-transplant, post-transplant hospitalization and outpatient follow-up, potential to participate in a research protocol, and medication management and side effects.  A question and answer session was provided after the presentation.    The patient was seen by all members of the multi-disciplinary team to include: Nephrologist/PARESH, Surgeon, , Transplant Coordinator, , Pharmacist and Dietician (if applicable).    The patient reviewed and signed all consents for evaluation which were witnessed and sent to scanning into the [facility] chart.    The patient was given an education book and plan for further evaluation based on his individual assessment.      Reviewed program requirement for complete COVID vaccination with documentation prior to listing.  COVID education information reviewed with patient. Patient encouraged to be up to date on all vaccinations.     The patient was informed that the transplant team would manage immediate post op pain. If the patient requires long term pain management, they will need to have that pain management addressed by their PCP or previous provider who wrote for long term pain medicines.    The patient was encouraged  to call with any questions or concerns.

## 2024-12-04 ENCOUNTER — TELEPHONE (OUTPATIENT)
Dept: TRANSPLANT | Facility: CLINIC | Age: 39
End: 2024-12-04
Payer: COMMERCIAL

## 2024-12-05 ENCOUNTER — TELEPHONE (OUTPATIENT)
Dept: TRANSPLANT | Facility: CLINIC | Age: 39
End: 2024-12-05
Payer: COMMERCIAL

## 2024-12-10 ENCOUNTER — TELEPHONE (OUTPATIENT)
Dept: TRANSPLANT | Facility: CLINIC | Age: 39
End: 2024-12-10
Payer: COMMERCIAL

## 2024-12-23 DIAGNOSIS — Z76.82 ORGAN TRANSPLANT CANDIDATE: Primary | ICD-10-CM

## 2024-12-31 ENCOUNTER — LAB VISIT (OUTPATIENT)
Dept: LAB | Facility: HOSPITAL | Age: 39
End: 2024-12-31
Payer: COMMERCIAL

## 2024-12-31 DIAGNOSIS — Z76.82 ORGAN TRANSPLANT CANDIDATE: ICD-10-CM

## 2024-12-31 PROCEDURE — 86825 HLA X-MATH NON-CYTOTOXIC: CPT | Mod: 91,TXP | Performed by: NURSE PRACTITIONER

## 2024-12-31 PROCEDURE — 86825 HLA X-MATH NON-CYTOTOXIC: CPT | Mod: TXP | Performed by: NURSE PRACTITIONER

## 2025-01-03 ENCOUNTER — COMMITTEE REVIEW (OUTPATIENT)
Dept: TRANSPLANT | Facility: CLINIC | Age: 40
End: 2025-01-03
Payer: COMMERCIAL

## 2025-01-03 ENCOUNTER — EPISODE CHANGES (OUTPATIENT)
Dept: TRANSPLANT | Facility: HOSPITAL | Age: 40
End: 2025-01-03

## 2025-01-03 ENCOUNTER — TELEPHONE (OUTPATIENT)
Dept: TRANSPLANT | Facility: CLINIC | Age: 40
End: 2025-01-03
Payer: COMMERCIAL

## 2025-01-03 NOTE — COMMITTEE REVIEW
Native Organ Dx: Diabetes Mellitus - Type II      SELECTION COMMITTEE NOTE    Zeeshan Correa was presented at selection committee on 1/03/2025 .  Patient met selection criteria for kidney transplant related to ESRD due to   Diabetes Mellitus - Type II.  No absolute contraindications to transplant at this time.  Patient will be placed on the cadaveric wait list pending final financial approval from insurance company.  Patient will return to clinic for routine appointment in 12 months. Patient does not meet criteria for High KDPI kidney offer. Patient meets HCV+ kidney offer. Patient does not meet criteria for dual/enbloc, due to weight, age.  Planned immunosuppression Thymoglobulin.    Notified patient, confirmed that     Note written by Linda Centeno RN    ===============================================    I was present at the meeting and attest to the general consensus of the committee.   Amadeo Maurer Jr.

## 2025-01-08 LAB
B CELL RESULTS - XM ALLO: NEGATIVE
B MCS AVERAGE - XM ALLO: 1.4
DONOR MRN: NORMAL
FXMAL TESTING DATE: NORMAL
HLA FLOW CROSSMATCH (ALLO) INTERPRETATION: NORMAL
SERUM COLLECTION DT - XM ALLO: NORMAL
T CELL RESULTS - XM ALLO: NEGATIVE
T MCS AVERAGE - XM ALLO: 2.5

## 2025-01-30 ENCOUNTER — TELEPHONE (OUTPATIENT)
Dept: TRANSPLANT | Facility: CLINIC | Age: 40
End: 2025-01-30
Payer: COMMERCIAL

## 2025-01-30 DIAGNOSIS — Z76.82 AWAITING ORGAN TRANSPLANT STATUS: Primary | ICD-10-CM

## 2025-01-30 DIAGNOSIS — Z76.82 ORGAN TRANSPLANT CANDIDATE: ICD-10-CM

## 2025-01-30 DIAGNOSIS — Z99.2 ESRD ON DIALYSIS: ICD-10-CM

## 2025-01-30 DIAGNOSIS — N18.6 ESRD ON DIALYSIS: Primary | ICD-10-CM

## 2025-01-30 DIAGNOSIS — N18.6 ESRD ON DIALYSIS: ICD-10-CM

## 2025-01-30 DIAGNOSIS — Z99.2 ESRD ON DIALYSIS: Primary | ICD-10-CM

## 2025-02-10 ENCOUNTER — TELEPHONE (OUTPATIENT)
Dept: TRANSPLANT | Facility: CLINIC | Age: 40
End: 2025-02-10
Payer: COMMERCIAL

## 2025-02-28 ENCOUNTER — TELEPHONE (OUTPATIENT)
Dept: TRANSPLANT | Facility: CLINIC | Age: 40
End: 2025-02-28
Payer: COMMERCIAL

## 2025-02-28 NOTE — TELEPHONE ENCOUNTER
This SW received notification from [employer] [facility] ([employer]) stating that this patient had requested accessed. This SW contacted this patient to follow up regarding [employer] access. The patient was unable to provide additional information needed to complete his invitation to his potential donor- mother Fatoumata Correa.   The patient provided this SW verbal consent to contact his mother to discuss [employer].  This SW contacted the patient's mother  to follow-up to obtain her information. The patient's mother provided this SW with her email address and . This SW tried to send the invitation to the patient and his mother and was notified that the patient's mother already had a account. This patient's mother informed this SW that she started the process but she was just waiting for her son to submit his information. This SW discovered that this patient's mother requested accessed to both the recipient and donor portion of the application. This SW contacted the patient and provided a update regarding the patient's mother having acces to the recipient portion of the application. This SW contacted the patient's mother and provided a update to her regarding her access to both the recipient and donor portion of the application for [employer]. This SW provided the patient's mother with the contact information for [employer] if she requested additional assistance.     Agustin SHANKARW, Kidney Transplant   [facility] [id number] [place] [address]

## 2025-03-14 ENCOUNTER — TELEPHONE (OUTPATIENT)
Dept: TRANSPLANT | Facility: CLINIC | Age: 40
End: 2025-03-14
Payer: COMMERCIAL

## 2025-03-14 NOTE — TELEPHONE ENCOUNTER
"HANNAH returned call to pt's spouse. Desiree inquired about fundraising resources. HANNAH emailed resource packets to pt's wife [email or web address].     Pt's wife denied further concerns and SW remains available.        ----- Message from Willian sent at 3/14/2025 12:31 PM CDT -----  Regarding: call back  Consult/Advisory:  Name Of Caller: DESIREE (Spouse) Contact Preference?:[phone] What is the nature of the call?: Calling to speak w/  someone in regards to some question she has requesting call back   Additional Notes:"Thank you for all that you do for our patients"  "

## 2025-04-24 ENCOUNTER — TELEPHONE (OUTPATIENT)
Dept: TRANSPLANT | Facility: CLINIC | Age: 40
End: 2025-04-24
Payer: COMMERCIAL

## 2025-04-24 NOTE — TELEPHONE ENCOUNTER
"Spoke to pt, he states that he sent his financial paperwork via mail yesterday and he is going to [facility] for pd cath placement and will have 2nd abo drawn .----- Message from Willian sent at 4/23/2025  1:33 PM CDT -----  Regarding: call back  Consult/Advisory:  Name Of Caller: Self Contact Preference?:[phone] What is the nature of the call?: Calling to speak w/  Linda in regards to some question he has requesting call back  Additional Notes:"Thank you for all that you do for our patients"  "

## 2025-04-28 ENCOUNTER — TELEPHONE (OUTPATIENT)
Dept: TRANSPLANT | Facility: CLINIC | Age: 40
End: 2025-04-28
Payer: COMMERCIAL

## 2025-04-28 NOTE — TELEPHONE ENCOUNTER
"Duplicate message, already addressed.----- Message from Mariely Agarwal sent at 4/24/2025  4:59 PM CDT -----  Regarding: FW: call back    ----- Message -----  From: Willian Alex  Sent: 4/23/2025   1:35 PM CDT  To: [facility] Pre-Kidney Transplant Clinical  Subject: call back                                        Consult/Advisory:  Name Of Caller: Self Contact Preference?:[phone] What is the nature of the call?: Calling to speak w/  Linda in regards to some question he has requesting call back  Additional Notes:"Thank you for all that you do for our patients"  "

## 2025-04-29 ENCOUNTER — TELEPHONE (OUTPATIENT)
Dept: TRANSPLANT | Facility: CLINIC | Age: 40
End: 2025-04-29
Payer: COMMERCIAL

## 2025-04-29 NOTE — TELEPHONE ENCOUNTER
----- Message from Kaylan sent at 4/25/2025 10:16 AM CDT -----  Regarding: Nurse Linda - lab was done yesterday  Contact: Patient can be contacted @# [phone]  [facility]# [phone]

## 2025-04-29 NOTE — TELEPHONE ENCOUNTER
Online request for ABO submitted via [facility] request portal.  ----- Message from Kaylan sent at 4/25/2025 10:16 AM CDT -----  Regarding: Nurse Linda - lab was done yesterday  Contact: Patient can be contacted @# [phone]  [facility] [phone]

## 2025-04-29 NOTE — TELEPHONE ENCOUNTER
"Duplicate message, already addressed.----- Message from Mariely Agarwal sent at 4/24/2025  4:59 PM CDT -----  Regarding: FW: call back    ----- Message -----  From: Willian Alex  Sent: 4/23/2025   1:35 PM CDT  To: [facility] Pre-Kidney Transplant Clinical  Subject: call back                                        Consult/Advisory:  Name Of Caller: Self Contact Preference?:[phone] What is the nature of the call?: Calling to speak w/  Linda in regards to some question he has requesting call back  Additional Notes:"Thank you for all that you do for our patients"  "

## 2025-05-07 ENCOUNTER — TELEPHONE (OUTPATIENT)
Dept: TRANSPLANT | Facility: CLINIC | Age: 40
End: 2025-05-07
Payer: COMMERCIAL

## 2025-05-07 NOTE — TELEPHONE ENCOUNTER
Spoke to pt's wife, still waiting for 2nd ABO for verification to activate patient on the kidney transplant waitlist.  She states that they shanika it last week when he had surgery.  I tried to get the result from [facility] and was told that they only shanika a glucose level, nothing else.  Pt satates that he will o to his pcp office tomorrow morning to have it drawn.  I called and left message for Adeola Billy NP,  And faxed lab order to their office.----- Message from [employer] sent at 5/7/2025  2:16 PM CDT -----  Regarding: Consult/Advisory  Contact: Zeeshan Correa   Consult/Advisory Name Of Caller:Zeeshan Correa   Contact Preference:[phone]  Nature of call:Patient is calling to speak to Linda about paperwork. Requesting a call back

## 2025-05-08 ENCOUNTER — TELEPHONE (OUTPATIENT)
Dept: TRANSPLANT | Facility: CLINIC | Age: 40
End: 2025-05-08
Payer: COMMERCIAL

## 2025-05-08 ENCOUNTER — EPISODE CHANGES (OUTPATIENT)
Dept: TRANSPLANT | Facility: CLINIC | Age: 40
End: 2025-05-08

## 2025-05-08 NOTE — TELEPHONE ENCOUNTER
Returned call to pt. Informed pt fax successfully sent to [facility] (fax:[phone]). Pt stated he will call back once he has it drawn. Pt advised we need it faxed over to us once completed. Pt voiced understanding.

## 2025-05-08 NOTE — TELEPHONE ENCOUNTER
----- Message from Kaylan sent at 5/8/2025  8:48 AM CDT -----  Regarding: lab orders pls send today  Contact: Patient can be contacted @#  [phone]  PT needs to have order placed for him to have another blood type.Please send order today and notify PT when has been sent so he can [facility]  #  [phone] Patient can be contacted @#  [phone]

## 2025-05-09 ENCOUNTER — TELEPHONE (OUTPATIENT)
Dept: TRANSPLANT | Facility: CLINIC | Age: 40
End: 2025-05-09
Payer: COMMERCIAL

## 2025-05-09 NOTE — TELEPHONE ENCOUNTER
----- Message from Med Assistant Hameed sent at 5/8/2025  1:55 PM CDT -----  Regarding: FW: New INS take effect 6/1/25  Contact: Patient can be contacted @#  [phone]    ----- Message -----  From: Kaylan Beach  Sent: 5/8/2025   1:43 PM CDT  To: [facility] Pre-Kidney Transplant Non-Clinical  Subject: New INS take effect 6/1/25                       PT states he called the insurance and has gotten it. He states it will take effect 6/1/25. Please advise at your earliest conveniencePatient can be contacted @#  [phone]

## 2025-05-14 ENCOUNTER — TELEPHONE (OUTPATIENT)
Dept: TRANSPLANT | Facility: CLINIC | Age: 40
End: 2025-05-14
Payer: COMMERCIAL

## 2025-05-14 DIAGNOSIS — Z76.82 ORGAN TRANSPLANT CANDIDATE: Primary | ICD-10-CM

## 2025-05-14 NOTE — TELEPHONE ENCOUNTER
"KIDNEY WAIT LISTING NOTE    Date of Financial clearance to list: 25    SSN/[facility]:     Organ: Kidney    Last Name: Madeline  First Name: Zeeshan    : 1985       Gender: male        MRN#: [id number]                                   State of Permanent Residence: [address] [place] [id number]    Ethnicity: Not  or /a   Race:      White    CLINICAL INFORMATION   Candidate Medical Urgency Status: Active (1)  Number of Previous Kidney Transplants: 0  Number of Previous Solid Organ Transplants: 0  Did you enter number of previous kidney or other solid organ transplants? Yes  Is this Candidate a Prior Living Donor: No       (If yes, please generate letter to [employer] with patient's date of donation, recipient SSN, signed by Surgical Director after patient is listed in order to receive priority points).      ABO  ABO Blood Group:   A NEG     ABO Confirmation: (THESE DATES MUST BE PRIOR TO THE LIST DATE AND SUPPORTED BY SEPARATE LAB REPORTS)    Internal Results    Lab Results   Component Value Date    GROUPTRH A NEG 2024     No results found for: "ABO"    External Results    ABO Date 1: 25   ABO Date 2  Are either of these ABO results based on External Labs? Yes  (If Yes, STOP and go to source document in Media Tab for verification).    VITALS  Height:  5' 8.5"  Weight:  116.1 kg  (Use height from Transplant clinic visits only).  Did you enter height/weight? Yes    HLA    Class I:  Lab Results   Component Value Date    QUNF0XB 11 2024    GMJW5VQ 29 2024    TSLM1EO 44 2024    KICW1HS 52 2024    VILZE7TX 4 2024    LMIPY1WW XX 2024    SAKJL1TX 12 2024    JKZJD0BP 16 2024       Class II:  Lab Results   Component Value Date    EVKZTB06GT 7 2024    XMHQQS27PL 15 2024    QZHKJU544RY 53 2024    MFGSLE1609 51 2024    UKPFO8LR 2 2024    LRPAO4FF 6 2024       Tested for HLA Antibodies: Yes, antibodies " "detected     If result is "Positive" antibodies are detected     If result is "Negative or questionable" no antibodies detected    No results found for: "CIPRAS", "CIIPRAS"    DIALYSIS INFORMATION  Is patient Pre-Dialysis: No     GFR Information  Report GFR being used as the criteria for placement on the kidney list. If not, leave blank  GFR < or = 20 ml/min? n/a  If Yes, Specify value  ___   ml/min     Initial date GFR became 20 or less:   Is GFR obtained from an Outside lab Result? n/a  (If YES verify with source document scanned into media)    If patient on Dialysis:    Is candidate currently on dialysis for ESRD? Yes  If Yes,  Date Chronic Dialysis Started:   8/19/2024  (verify with source document in Media Tab)   Dialysis Unit Name: [address] [address]                        Physician Name:  Dr. Mary Gatica  NPI#: [id number]    DIABETES INFORMATION  Primary Native Kidney Diagnosis: Diabetes Mellitus - Type II  C-Peptide Value - No results found for: "CPEPTIDE"  Current Diabetes Status: Type II diabetes    FOR NON-KIDNEY DEPARTMENT USE ONLY:  Additional Organs Registered? none    Maximum Acceptable Number of HLA Mismatches  ABDR:     6      (0-6)               AB:               (0-4)  ADR:   _____  (0-4)              BDR: _____ (0-4)  A:        _____  (0-2)              B:      _____ (0-2)          DR: ______ (0-2)    Will Recipient Accept?   Accept HBcAB Positive Organ:            Yes  Accept HBV RILEY Positive Organ:        No  Accept HCV Antibody Positive Organ: Yes   Accept HCV RILEY Positive Organ: Yes    Dual Kidney and En Bloc Opt In : No  Dual  Local:   No  Dual Import:   No  En Bloc Local:   No  En Bloc Import: No     Accept KDPI > 85: Single: No     Local: No     Import: No  Accept KDPI > 85: Dual: No     Local: No     Import: No    ### NURSE TO VERIFY CONSENT AND MAKE ANY NECESSARY CHANGES NEEDED IN UNET AT THE TIME OF VERIFICATION ###    Unacceptible Antigens  If yes, " list     Lab Results   Component Value Date    LR3SPDK Cw15, B59, Cw5, Cw2 12/03/2024    CIABCLM Weak Cw18, B71 12/03/2024    CIIAB DQA1*05, DQA1*06,DQA1*04 12/03/2024       ### DO NOT LIST IF ANTIGEN VALUE WEAK ###    eGFR Wait Time Modification    Based on Race White does patient qualify for lab review? No     If found, generate eGFR Wait Time Modification Form and scan into Media.   Send patient letter when wait time is granted.     Hep B Vaccine series completed: yes    Blood Type x2 was verified by myself and Jose R Hugo RN.  Blood type determination and reporting was completed according to the programs protocols and OPTN requirements.    Notified pt's wife. Spoke to Roro at [facility], she confirmed that monthly labs are drawn on the first Thursday of the month, next lab draw 6/5/25 but she will ask patient to come in for lab draw when the HLA tubes arrive.  She asked to have the tubes sent to the main office:   [address] [phone]

## 2025-05-14 NOTE — LETTER
May 14, 2025      PETEY Correa  [address]  [place] [address]    Dear PETEY Correa:  MRN: [id number]    Congratulations! On 2025, you were placed on  the waiting list for a  donor transplant.    Your candidacy for kidney transplant is based on the following criteria: Diabetes Mellitus type II.    Your transplant coordinator while on the waiting list is Fawn Sahu RN. They can be reached at [phone] or [phone] with any questions.      What to do now?    Ask your living donors to begin testing   Share our screening website with anyone interested: [email or web address]  Make sure donors have your name and date of birth  You will get transplanted much faster if you have a living donor    Have your blood sent to our Transplant Lab every month  If you are on dialysis - our Transplant Lab will work with your dialysis unit to send your blood every month  If you are not on dialysis   If you live near an [facility] lab, we will schedule you to have blood drawn every month  If you do not live near an [facility] lab, you will be sent blood kits in the mail. You will need to take a kit to your local lab or doctor to have your blood drawn every month and mail to the Transplant Lab.     Call us with ANY CHANGES  Phone numbers - we must be able to reach you anytime of the day or night when a kidney is available  Address  Insurance coverage  Dialysis unit or kidney doctor  [place]son: if you have surgery, stay in the hospital, have to get blood, or have an infection    Review your Kidney/Kidney-Pancreas Transplant Guide   This will give you detailed information about what happens when  you are on the waiting list   you are called when a kidney is available    The [facility] has a transplant surgeon and physician available 365 days a year, 24 hours a day to coordinate organ acceptance, procurement, surgical placement and to address urgent patient care issues.  You will be notified  in writing of any changes to our Transplant Centers staffing plan that would impact your ability to receive a transplant.        Attached is a letter from the [employer] Organ Sharing (UN[employer]). It describes the services and information offered to patients by [employer] and the Organ Procurement and Transplant Network. We look forward to working with you while on the waiting list.     We would like to inform you of an important OPTN (Organ Procurement and Transplant Network) Policy change that may affect the waiting time for some candidates on our waiting list.     Waiting time is important in identifying who receives offers for kidneys. A long wait time may increase your chance of getting an offer. Wait time is based on a test called eGFR that tells how well your kidneys are working. Wait time could also be based on how long you have been on dialysis. Government and health officials have changed the way this test is used. Before this year, hospitals used an eGFR that would include your race. For Black or  Americans, this eGFR could have shown that their kidneys were working better than they were.    Because of this change, we are looking at everyones record and assessing waiting time for people who are eligible. We will be reviewing everyones medical records and will contact you if you are eligible.     Who can I talk to if I have a question?  You can contact us if you have questions or send a message through [facility].     Please give us time to answer your questions. We are working on this for many patients.    How can I learn more about eGFR and this policy change?  Go to OPTN website > Patients > Kidney > FAQ: Understanding race-neutral eGFR calculations  Full URL: https://optn.transplant.hrsa.gov/patients/by-organ/kidney/understanding-the-proposal-to-require-race-neutral-egfr-calculations/  Call the Organ Procurement and Transplantation Network (OPTN) toll-free patient services line at  6-418-242-4753    Congratulations,    Your Transplant Partner  [facility]   [facility] [place] [address]  [phone]  lh/enclosure    Cc: Pallavi Shirsat, MD                                                The Organ Procurement and Transplantation Network   Toll-free patient services line: [phone]  Your resource for organ transplant information      Staffed 8:30 am - 5:00 pm ET Monday - Friday   Leave a message 24/7 to receive a call back    The Organ Procurement and Transplantation Network (OPTN) is the national transplant system. It makes the policies that decide how donated organs are matched to patients waiting for a transplant. The OPTN:    Makes sure donated organs get matched to people on the transplant waiting list  Tells people about the donation and transplant processes  Makes sure that the public knows about the need for more organ and tissue donations    The OPTN has a free patient services line that you can call to:  Get more information about:   o Organ donation and organ transplants   o Donation and transplant policies  Get an information kit with:   o A list of transplant hospitals   o Waiting list information  Talk about any questions you may have about your transplant hospital or organ procurement organization. The staff will do their best to help you or point you to others who may help.  Find out how you can volunteer with the OPTN and help shape transplant policy    The patient services line number is: [phone]    Patient services line staff CANNOT answer questions about your own medical care, including:  Waiting list status  Test results  Medical records  You will need to call your transplant hospital for this information.    The following websites have more information about transplantation and donation:  OPTN: https://optn.transplant.hrsa.gov/  For potential living donors and transplant recipients:   o Living donation process:  https://optn.transplant.hrsa.gov/living-donation/     o Financial assistance: https://www.livingdonorassistance.org/  Transplantation data: https://www.srtr.org/  Organ donation: https://www.organdonor.gov/    Volunteer with the OPTN: https://optn.transplant.hrsa.gov/get-involved

## 2025-05-14 NOTE — TELEPHONE ENCOUNTER
Returned call, Mrs Correa confirmed that ABO was drawn at [facility], she provided number [phone], I called and was instructed to call [phone], ABO found and will send via fax----- Message from Linda Centeno sent at 5/12/2025  5:08 PM CDT -----  Regarding: FW: Nurse Mckeon  Contact: Patient wife  can be contacted @# [phone]    ----- Message -----  From: Kaylan Beach  Sent: 5/12/2025   1:09 PM CDT  To: [facility] Pre-Kidney Transplant Clinical  Subject: Nurse Mckeon                                    PT wife called asking to speak to Nurse Mckeon. Please call at your earliest convenience. She has something else to ask youPatient wife  can be contacted @# [phone]

## 2025-05-20 ENCOUNTER — TELEPHONE (OUTPATIENT)
Dept: TRANSPLANT | Facility: CLINIC | Age: 40
End: 2025-05-20
Payer: COMMERCIAL

## 2025-05-20 DIAGNOSIS — Z76.82 AWAITING ORGAN TRANSPLANT: Primary | ICD-10-CM

## 2025-05-21 ENCOUNTER — TELEPHONE (OUTPATIENT)
Dept: TRANSPLANT | Facility: CLINIC | Age: 40
End: 2025-05-21
Payer: COMMERCIAL

## 2025-05-21 NOTE — TELEPHONE ENCOUNTER
I called patient to introduce myself and discuss waitlist process. Patient informed that he is going to be inactive on the transplant list pending his insurance complication resolution. Patient stated that his BC/BS cancelled him on March 31,2025. He applied for medicare part D and should be in effect on June 1, 2025. He also stated that he is applying for Medicaid.I explained to him that he will need to communicate with the  regarding if he gets medicaid b/c we will need authorization from medicaid for the listing.

## 2025-05-21 NOTE — LETTER
May 21, 2025    PETEY Correa  [address]  [place] [address]    Dear PETEY Madeline:  MRN: [id number]    The [facility] team reviewed your transplant candidacy.  It is our programs opinion that you are temporarily not a transplant candidate at our facility as of 5/21/2025 because of insurance issues.  You will remain listed on the wait list, but will not be able to receive a transplant until this issue is resolved.      Attached is a letter from the [employer] Organ Sharing ([employer]).  It describes the services and information offered to patients by UN[employer] and the Organ Procurement and Transplant Network.    The [facility] team remains available to answer any questions.  Should you have any questions regarding this decision, please do not hesitate to contact our pre-transplant office.      Sincerely,        Mary Alvarez MD  Medical Director, Kidney & Kidney/Pancreas Transplantation  lh/enclosure    Cc:  Pallavi Shirsat, MD          [facility]               The Organ Procurement and Transplantation Network   Toll-free patient services line: [phone]  Your resource for organ transplant information      Staffed 8:30 am - 5:00 pm ET Monday - Friday   Leave a message 24/7 to receive a call back    The Organ Procurement and Transplantation Network (OPTN) is the national transplant system. It makes the policies that decide how donated organs are matched to patients waiting for a transplant. The OPTN:    Makes sure donated organs get matched to people on the transplant waiting list  Tells people about the donation and transplant processes  Makes sure that the public knows about the need for more organ and tissue donations    The OPTN has a free patient services line that you can call to:  Get more information about:   o Organ donation and organ transplants   o Donation and transplant policies  Get an information kit with:   o A list of transplant hospitals   o Waiting  list information  Talk about any questions you may have about your transplant hospital or organ procurement organization. The staff will do their best to help you or point you to others who may help.  Find out how you can volunteer with the OPTN and help shape transplant policy    The patient services line number is: 2-719-765-1730    Patient services line staff CANNOT answer questions about your own medical care, including:  Waiting list status  Test results  Medical records  You will need to call your transplant hospital for this information.    The following websites have more information about transplantation and donation:  OPTN: https://optn.transplant.hrsa.gov/  For potential living donors and transplant recipients:   o Living donation process: https://optn.transplant.hrsa.gov/living-donation/     o Financial assistance: https://www.livingdonorassistance.org/  Transplantation data: https://www.srtr.org/  Organ donation: https://www.organdonor.gov/    Volunteer with the OPTN: https://optn.transplant.hrsa.gov/get-involved

## 2025-06-05 ENCOUNTER — TELEPHONE (OUTPATIENT)
Dept: TRANSPLANT | Facility: CLINIC | Age: 40
End: 2025-06-05
Payer: COMMERCIAL

## 2025-06-16 ENCOUNTER — TELEPHONE (OUTPATIENT)
Dept: TRANSPLANT | Facility: CLINIC | Age: 40
End: 2025-06-16
Payer: COMMERCIAL

## 2025-07-23 ENCOUNTER — TELEPHONE (OUTPATIENT)
Dept: TRANSPLANT | Facility: CLINIC | Age: 40
End: 2025-07-23
Payer: MEDICARE

## 2025-07-23 DIAGNOSIS — Z76.82 ORGAN TRANSPLANT CANDIDATE: Primary | ICD-10-CM

## 2025-07-23 NOTE — TELEPHONE ENCOUNTER
Spoke with patient, let him know we were working on an exchange for him and would call with more details soon.  Also let him know we needed a fresh sample to check on if his donor and I was sending him a kit.  All questions answered.

## 2025-07-24 ENCOUNTER — TELEPHONE (OUTPATIENT)
Dept: TRANSPLANT | Facility: CLINIC | Age: 40
End: 2025-07-24
Payer: MEDICARE

## 2025-07-24 NOTE — TELEPHONE ENCOUNTER
Notified of Surgery date 9/29/25 with preop testing 9/18/25. Reinforced need for new blood sample and patient confirms he will do as soon as kit is received. All questions were answered.

## 2025-07-25 DIAGNOSIS — Z76.82 ORGAN TRANSPLANT CANDIDATE: Primary | ICD-10-CM

## 2025-07-29 ENCOUNTER — TELEPHONE (OUTPATIENT)
Dept: TRANSPLANT | Facility: CLINIC | Age: 40
End: 2025-07-29
Payer: MEDICARE

## 2025-07-29 NOTE — TELEPHONE ENCOUNTER
I returned patient's call and he informed me that he is scheduled for pre-op and LRDat the time of his Jury Duty. I told patient that I don't see it scheduled in the system so I will talk to the donor coordinator naheed and would be happy to send him a letter once I have the dates.  He verbalized understanding and said it was going to be pre-op in September and LRD in October      Copied from CRM #[id number]. Topic: General Inquiry - Patient Advice  >> Jul 28, 2025  2:30 PM Kaylan wrote:  PT wife called stating PT recvd a summons for jury duty and he is sched to come in for Txp. They are needing a letter to let them know PT will be in [place] for txp testing and unable to be a juror.   They will set up Danica - Please notify when has been sent so they can access    Contact  [phone]

## 2025-08-11 ENCOUNTER — TELEPHONE (OUTPATIENT)
Dept: TRANSPLANT | Facility: CLINIC | Age: 40
End: 2025-08-11
Payer: MEDICARE

## 2025-08-25 ENCOUNTER — TELEPHONE (OUTPATIENT)
Dept: TRANSPLANT | Facility: CLINIC | Age: 40
End: 2025-08-25
Payer: MEDICARE

## 2025-08-28 ENCOUNTER — TELEPHONE (OUTPATIENT)
Dept: TRANSPLANT | Facility: CLINIC | Age: 40
End: 2025-08-28
Payer: MEDICARE

## 2025-08-28 DIAGNOSIS — Z76.82 ORGAN TRANSPLANT CANDIDATE: Primary | ICD-10-CM
